# Patient Record
Sex: FEMALE | Race: OTHER | URBAN - METROPOLITAN AREA
[De-identification: names, ages, dates, MRNs, and addresses within clinical notes are randomized per-mention and may not be internally consistent; named-entity substitution may affect disease eponyms.]

---

## 2024-07-19 ENCOUNTER — OFFICE VISIT (OUTPATIENT)
Age: 24
End: 2024-07-19

## 2024-07-19 VITALS
WEIGHT: 171.6 LBS | HEIGHT: 64 IN | DIASTOLIC BLOOD PRESSURE: 69 MMHG | TEMPERATURE: 98.1 F | HEART RATE: 76 BPM | SYSTOLIC BLOOD PRESSURE: 104 MMHG | BODY MASS INDEX: 29.3 KG/M2

## 2024-07-19 DIAGNOSIS — E88.819 INSULIN RESISTANCE: Primary | ICD-10-CM

## 2024-07-19 RX ORDER — LEVONORGESTREL AND ETHINYL ESTRADIOL 0.15-0.03
1 KIT ORAL DAILY
COMMUNITY

## 2024-07-19 NOTE — PROGRESS NOTES
Endocrine Consultation    PCP: Rohini Llanos MD    Chief Complaint   Patient presents with    New Patient    Polycystic Ovarian Syndrome     HPI:  Mayda Yu is a 23 y.o. female with  has a past medical history of Polycystic ovary syndrome. who is seen in consultation at the request of Rohini Llanos MD for evaluation of pcos.  Normal tfts   Normal prl, cr 0.6  On labs this year   PCOS HX   Diagnosis: 3 years ago   Medications: ocp, stopped metformin as glucose coming   No prior pregnancies   Menarche at 11   Now with regular menses   Prior to ocp, initially menses regular, at 13 w/amenorrhea x 1 year   Dx w/pcos by gyn around that time   No hirsutism   No acne   Full ROS completed this visit:  Negative for weight gain, weight loss, fevers, chills, blurry vision, changes in vision, chest pain, palpitations, leg swelling, shortness of breath, wheezing, snoring, abdominal pain, nausea, vomiting, diarrhea, constipation, frequent urination, dysuria, tremors, fainting, shakes, cold intolerance, hot intolerance, depression, foot sores, rash.    LABS/STUDIES:     No results found for: \"RFF1ACVA\"    No results found for: \"LABA1C\", \"QZKH9RVRC\", \"CREATININE\", \"LABGLOM\", \"EGFR\", \"MALBCR\"    No results found for: \"CHOL\", \"TRIG\", \"HDL\"    No results found for: \"TSH\"    No results found for: \"CPEPLT\", \"CPEPTIDE\"    Current Outpatient Medications   Medication Sig Dispense Refill    levonorgestrel-ethinyl estradiol (KURVELO) 0.15-30 MG-MCG per tablet Take 1 tablet by mouth daily       No current facility-administered medications for this visit.        Past Medical History:   Diagnosis Date    Polycystic ovary syndrome         No past surgical history on file.     Social History     Tobacco Use    Smoking status: Not on file    Smokeless tobacco: Not on file   Substance Use Topics    Alcohol use: Not on file      Employer:  No address on file.     No family history on file.     PHYSICAL EXAM  Blood pressure 
Applied